# Patient Record
Sex: FEMALE | Race: WHITE | NOT HISPANIC OR LATINO | URBAN - METROPOLITAN AREA
[De-identification: names, ages, dates, MRNs, and addresses within clinical notes are randomized per-mention and may not be internally consistent; named-entity substitution may affect disease eponyms.]

---

## 2017-06-06 ENCOUNTER — OFFICE VISIT - RIVER FALLS (OUTPATIENT)
Dept: FAMILY MEDICINE | Facility: CLINIC | Age: 62
End: 2017-06-06

## 2017-06-06 ASSESSMENT — MIFFLIN-ST. JEOR: SCORE: 1285.77

## 2017-07-18 ENCOUNTER — OFFICE VISIT - RIVER FALLS (OUTPATIENT)
Dept: FAMILY MEDICINE | Facility: CLINIC | Age: 62
End: 2017-07-18

## 2017-07-18 ASSESSMENT — MIFFLIN-ST. JEOR: SCORE: 1300.29

## 2018-06-12 ENCOUNTER — OFFICE VISIT - RIVER FALLS (OUTPATIENT)
Dept: FAMILY MEDICINE | Facility: CLINIC | Age: 63
End: 2018-06-12

## 2018-06-12 ASSESSMENT — MIFFLIN-ST. JEOR: SCORE: 1283.85

## 2019-06-28 ENCOUNTER — OFFICE VISIT - RIVER FALLS (OUTPATIENT)
Dept: FAMILY MEDICINE | Facility: CLINIC | Age: 64
End: 2019-06-28

## 2019-06-28 ASSESSMENT — MIFFLIN-ST. JEOR: SCORE: 1287.48

## 2019-07-05 ENCOUNTER — AMBULATORY - RIVER FALLS (OUTPATIENT)
Dept: FAMILY MEDICINE | Facility: CLINIC | Age: 64
End: 2019-07-05

## 2019-07-06 LAB
BUN SERPL-MCNC: 12 MG/DL (ref 7–25)
BUN/CREAT RATIO - HISTORICAL: NORMAL (ref 6–22)
CALCIUM SERPL-MCNC: 9.7 MG/DL (ref 8.6–10.4)
CHLORIDE BLD-SCNC: 106 MMOL/L (ref 98–110)
CHOLEST SERPL-MCNC: 193 MG/DL
CHOLEST/HDLC SERPL: 3.2 {RATIO}
CO2 SERPL-SCNC: 26 MMOL/L (ref 20–32)
CREAT SERPL-MCNC: 0.83 MG/DL (ref 0.5–0.99)
EGFRCR SERPLBLD CKD-EPI 2021: 74 ML/MIN/1.73M2
ERYTHROCYTE [DISTWIDTH] IN BLOOD BY AUTOMATED COUNT: 13 % (ref 11–15)
GLUCOSE BLD-MCNC: 87 MG/DL (ref 65–99)
HCT VFR BLD AUTO: 39.5 % (ref 35–45)
HDLC SERPL-MCNC: 61 MG/DL
HGB BLD-MCNC: 13.2 GM/DL (ref 11.7–15.5)
LDLC SERPL CALC-MCNC: 117 MG/DL
MCH RBC QN AUTO: 30.2 PG (ref 27–33)
MCHC RBC AUTO-ENTMCNC: 33.4 GM/DL (ref 32–36)
MCV RBC AUTO: 90.4 FL (ref 80–100)
NONHDLC SERPL-MCNC: 132 MG/DL
PLATELET # BLD AUTO: 241 10*3/UL (ref 140–400)
PMV BLD: 11.3 FL (ref 7.5–12.5)
POTASSIUM BLD-SCNC: 4.8 MMOL/L (ref 3.5–5.3)
RBC # BLD AUTO: 4.37 10*6/UL (ref 3.8–5.1)
SODIUM SERPL-SCNC: 141 MMOL/L (ref 135–146)
TRIGL SERPL-MCNC: 48 MG/DL
WBC # BLD AUTO: 5 10*3/UL (ref 3.8–10.8)

## 2019-07-09 ENCOUNTER — COMMUNICATION - RIVER FALLS (OUTPATIENT)
Dept: FAMILY MEDICINE | Facility: CLINIC | Age: 64
End: 2019-07-09

## 2019-10-11 ENCOUNTER — AMBULATORY - RIVER FALLS (OUTPATIENT)
Dept: FAMILY MEDICINE | Facility: CLINIC | Age: 64
End: 2019-10-11

## 2022-02-12 VITALS
DIASTOLIC BLOOD PRESSURE: 60 MMHG | HEART RATE: 64 BPM | WEIGHT: 166.8 LBS | SYSTOLIC BLOOD PRESSURE: 118 MMHG | BODY MASS INDEX: 27.79 KG/M2 | TEMPERATURE: 98.2 F | HEIGHT: 65 IN

## 2022-02-12 VITALS
HEIGHT: 66 IN | BODY MASS INDEX: 26.33 KG/M2 | SYSTOLIC BLOOD PRESSURE: 114 MMHG | TEMPERATURE: 97.8 F | SYSTOLIC BLOOD PRESSURE: 126 MMHG | WEIGHT: 167 LBS | HEIGHT: 66 IN | DIASTOLIC BLOOD PRESSURE: 78 MMHG | BODY MASS INDEX: 26.84 KG/M2 | WEIGHT: 163.8 LBS | TEMPERATURE: 98.8 F | HEART RATE: 80 BPM | HEART RATE: 88 BPM | DIASTOLIC BLOOD PRESSURE: 76 MMHG

## 2022-02-12 VITALS
HEART RATE: 80 BPM | SYSTOLIC BLOOD PRESSURE: 132 MMHG | BODY MASS INDEX: 27.66 KG/M2 | WEIGHT: 166 LBS | HEIGHT: 65 IN | TEMPERATURE: 98.6 F | DIASTOLIC BLOOD PRESSURE: 80 MMHG

## 2022-02-16 NOTE — NURSING NOTE
Phone Message    PCP:   HAYDE      Time of Call:  10:05 am       Person Calling:  pt  Phone number:  [phone]    Returned call at: 11:07 am    Note:   Pt calls requesting to be seen this afternoon after 4:30. c/o rash on bilateral ankles that she thinks is a reaction to something. Asked if she could come earlier as there are more openings available - pt states she has manditory overtime that she can't leave work. Asked her to come at 4:30, pt aware there may be a wait as she is being worked in.    Last office visit and reason:  6/6/17

## 2022-02-16 NOTE — TELEPHONE ENCOUNTER
---------------------  From: Mai Drummond   To: Wali Carbone MD;     Sent: 7/5/2019 8:56:07 AM CDT  Subject: General Message     Patient is wanting to schedule a dexa scan with her mammogram at [facility] but needs an order, if okay to have please enter order and I will get it to them. Thank you  ** Submitted: **  Order:Bone Densitometry (Request)  Details:  Osteoporosis         Signed by Wali Carbone MD  7/5/2019 9:15:00 AM---------------------  From: Wali Carbone MD   To: Mai Drummond;     Sent: 7/5/2019 9:16:01 AM CDT  Subject: RE: General Message     OK

## 2022-02-16 NOTE — TELEPHONE ENCOUNTER
---------------------  From: Sofi Carey   To: Tona KELLOGG Rueter;     Sent: 8/21/2019 9:45:15 AM CDT  Subject: General Message     For your information, we have no record that your patient completed the DEXA ordered 07/05/2019.  Per Ashleigh, patient declined.  Sofi Carey

## 2022-02-16 NOTE — PROGRESS NOTES
Patient:   DERIAN HUANG            MRN: [id number]            FIN: [id number]               Age:   61 years     Sex:  Female     :  1955   Associated Diagnoses:   Well adult exam   Author:   Wali Carbone MD      Report Summary   DiagnosisCourse:  Well controlled. Patient Instructions:       Counseled: Patient, Verbalized understanding. Counseled:  Patient.    Visit Information      Date of Service: 2017 04:53 pm  Performing Location: [facility]  Encounter#: [id number]      Primary Care Provider (PCP):  Wali Carbone MD    NPI# [id number]      Referring Provider:  Wali Carbone MD# [id number]   Visit type:  Annual exam.    Source of history:  Self.    History limitation:  None.       Chief Complaint   2017 4:57 PM CDT     Annual px     Patient presents today for a general physical.      Well Adult History   Well Adult History   The general health status is good.  The patient's diet is described as balanced.  Exercise: routine.  Last menstrual period: patient no longer menstruates due to hysterectomy, Remote.  The effect on daily activities is no change in activity level, no change in eating habits and no change in sexual activity.        Review of Systems   Constitutional:  No fever, No chills, No sweats.    Eye:  No blurring, No double vision.    Respiratory:  No shortness of breath, No cough, No wheezing.    Cardiovascular:  No chest pain, No palpitations, No peripheral edema.    Breast:  Negative.    Gastrointestinal:  No nausea, No vomiting, No diarrhea, No constipation.    Genitourinary:  No dysuria.    Gynecologic:  Negative.    Hematology/Lymphatics:  No bruising tendency.    Endocrine:  No excessive thirst, No polyuria, No cold intolerance, No heat intolerance.    Musculoskeletal:  No joint pain.    Integumentary:  No rash.    Neurologic:  Not alert and oriented X4, No confusion, No numbness, No tingling, No headache.    Psychiatric:  Negative.     All other systems reviewed and negative      Health Status   Allergies:    Allergic Reactions (Selected)  No known allergies   Medications:  (Selected)   Documented Medications  Documented  Calcium 600+D: PO, BID, 0 Refill(s)  Vitamin B Complex oral capsule: 1 cap(s), PO, Daily, 0 Refill(s)  Vitamin C: daily, 0 Refill(s)  omega-3 polyunsaturated fatty acids oral capsule: 0 Refill(s)   Problem list:    All Problems  Fibrocystic breast disease / ICD-9-.1 / Confirmed  Osteoporosis / ICD-9-.00 / Confirmed  Resolved: Pregnancy / SNOMED CT 937711230  Resolved: Tobacco abuse / ICD-9-.1      Histories   Past Medical History:    Active  Osteoporosis (ICD-9-.00)  Fibrocystic breast disease (ICD-9-.1)  Resolved  Tobacco abuse (ICD-9-.1):  Resolved on 2010 at 54 years.  Pregnancy (SNOMED CT 763321083):  Resolved in  at 22 years.   Family History:    Diabetes mellitus  Father ()  CVA - Cerebrovascular accident  Father ()  Glaucoma  Mother     Procedure history:    Colonoscopy (SNOMED CT 399869898) performed by Jeremy Erwin MD on 2013 at 57 Years.  Comments:  2013 1:42 PM - Deven Bolivar MA  Normal colonoscopy, repeat in 5 years due to hx of polyps  Dorsal first compartment release, left wrist on 2010 at 55 Years.  Colonoscopy performed by Jeremy Erwin MD on 2007 at 51 Years.  Polypectomy (SNOMED CT 821217674) in  at 47 Years.  Hysterectomy (SNOMED CT 391398068).  Hemorrhoidectomy (SNOMED CT 66166856).   Social History:        Alcohol Assessment: Past            Past      Tobacco Assessment: Past            Past, Cigarettes, 20 per day.  25 year(s).                     Comments:                      2010 - Christi Anderson CMA                     Quit       Substance Abuse Assessment: Denies Substance Abuse            Never      Employment and Education Assessment            Employed                     Comments:                       04/26/2013 - [employer]      Home and Environment Assessment            Marital status:  (Living together).  Spouse/Partner name: Scott.  Lives with Spouse.  1 children.               Living situation: Home/Independent.                     Comments:                      04/26/2013 - [employer] L[employer]      Nutrition and Health Assessment            Type of diet: Regular.      Exercise and Physical Activity Assessment: Does not exercise      Sexual Assessment            Sexual orientation: Heterosexual.  ,        Alcohol Assessment: Past            Past      Tobacco Assessment: Past            Past, Cigarettes, 20 per day.  25 year(s).                     Comments:                      07/06/2010 - Christi Anderson CMA 2001      Substance Abuse Assessment: Denies Substance Abuse            Never      Employment and Education Assessment            Employed                     Comments:                      04/26/2013 - [employer] L[employer]      Home and Environment Assessment            Marital status:  (Living together).  Spouse/Partner name: Scott.  Lives with Spouse.  1 children.               Living situation: Home/Independent.                     Comments:                      04/26/2013 - [employer] L[employer]      Nutrition and Health Assessment            Type of diet: Regular.      Exercise and Physical Activity Assessment: Does not exercise      Sexual Assessment            Sexual orientation: Heterosexual.        Physical Examination   Vital Signs   6/6/2017 4:57 PM CDT Temperature Temporal 98.8 DegF    Peripheral Pulse Rate 80 bpm    Pulse Site Radial artery    HR Method Manual    Systolic Blood Pressure 114 mmHg    Diastolic Blood Pressure 76 mmHg    Mean Arterial Pressure 89 mmHg    BP Site Right arm    BP  Method Manual      Measurements from flowsheet : Measurements   6/6/2017 4:57 PM CDT Height Measured - Standard 65.75 in    Weight Measured - Standard 163.8 lb    BSA 1.85 m2    Body Mass Index 26.64 kg/m2      General:  Alert and oriented, No acute distress.    Eye:  Pupils are equal, round and reactive to light, Extraocular movements are intact, Normal conjunctiva.    HENT:  Normocephalic, Tympanic membranes are clear, Normal hearing, Oral mucosa is moist, No pharyngeal erythema.    Neck:  Supple, No carotid bruit, No lymphadenopathy, No thyromegaly.    Respiratory:  Lungs are clear to auscultation, Breath sounds are equal.    Cardiovascular:  Regular rhythm, No murmur, Good pulses equal in all extremities, No edema.    Breast:  No mass, No tenderness, No discharge.    Gastrointestinal:  Soft, Non-tender, Normal bowel sounds, No organomegaly.    Genitourinary:  Declined Exam.    Musculoskeletal:  Normal range of motion.    Integumentary:  Warm, Dry, Pink.    Neurologic:  Alert, Oriented, Normal sensory, Normal motor function, No focal deficits.    Psychiatric:  Cooperative, Appropriate mood & affect, Normal judgment, Patient's PHQ9 and CAGE questionnaire reviewed and discussed with patient..       Impression and Plan   Diagnosis     Well adult exam (JUG54-YV Z00.00).     Course:  Well controlled.    Patient Instructions:       Counseled: Patient, Verbalized understanding.    Counseled:  Patient.

## 2022-02-16 NOTE — LETTER
(Inserted Image. Unable to display)   June 12, 2019      DERIAN HUANG  [address]   [place], [place] [id number]        Dear DERIAN,      Thank you for selecting [facility] (previously [facility]) for your healthcare needs.     Our records indicate you are due for the following services:     Annual Physical    To schedule an appointment or if you have further questions, please contact your primary clinic:   [facility]          [phone]   Former[facility]    [phone]             [facility]         [phone]      Powered by [employer]    Sincerely,    Fartun Villarreal M.D.

## 2022-02-16 NOTE — NURSING NOTE
Comprehensive Intake Entered On:  6/28/2019 1:13 PM CDT    Performed On:  6/28/2019 1:09 PM CDT by Jerri Blank CMA               Summary   Chief Complaint :   Physical    Weight Measured :   166.8 lb(Converted to: 166 lb 13 oz, 75.66 kg)    Height Measured :   65 in(Converted to: 5 ft 5 in, 165.10 cm)    Body Mass Index :   27.75 kg/m2 (HI)    Body Surface Area :   1.86 m2   Systolic Blood Pressure :   118 mmHg   Diastolic Blood Pressure :   60 mmHg   Mean Arterial Pressure :   79 mmHg   Peripheral Pulse Rate :   64 bpm   BP Method :   Manual   HR Method :   Manual   Temperature Tympanic :   98.2 DegF(Converted to: 36.8 DegC)    Jerri Blank CMA - 6/28/2019 1:09 PM CDT   Health Status   Allergies Verified? :   Yes   Medication History Verified? :   Yes   Immunizations Current :   Yes   Medical History Verified? :   Yes   Pre-Visit Planning Status :   Completed   Jerri Blank CMA - 6/28/2019 1:09 PM CDT   Consents   Consent for Immunization Exchange :   Consent Granted   Consent for Immunizations to Providers :   Consent Granted   Jerri Blank CMA - 6/28/2019 1:09 PM CDT   Meds / Allergies   (As Of: 6/28/2019 1:13:30 PM CDT)   Allergies (Active)   No known allergies  Estimated Onset Date:   Unspecified ; Created By:   Eula Zhang CMA; Reaction Status:   Active ; Category:   Drug ; Substance:   No known allergies ; Type:   Allergy ; Updated By:   Eula Zhang CMA; Reviewed Date:   6/28/2019 1:11 PM CDT      No Known Medication Allergies  Estimated Onset Date:   Unspecified ; Created By:   Eula Zhang CMA; Reaction Status:   Active ; Category:   Drug ; Substance:   No Known Medication Allergies ; Type:   Allergy ; Updated By:   Eula Zhang CMA; Reviewed Date:   6/28/2019 1:11 PM CDT        Medication List   (As Of: 6/28/2019 1:13:30 PM CDT)   Home Meds    ascorbic acid  :   ascorbic acid ; Status:   Documented ; Ordered As Mnemonic:   Vitamin C ; Simple Display Line:   daily ; Catalog Code:   ascorbic acid ;  Order Dt/Tm:   7/2/2010 12:17:32 PM          calcium-vitamin D  :   calcium-vitamin D ; Status:   Documented ; Ordered As Mnemonic:   Calcium 600+D ; Simple Display Line:   PO, BID ; Catalog Code:   calcium-vitamin D ; Order Dt/Tm:   7/2/2010 12:18:20 PM          multivitamin  :   multivitamin ; Status:   Documented ; Ordered As Mnemonic:   Vitamin B Complex oral capsule ; Simple Display Line:   1 cap(s), PO, Daily ; Catalog Code:   multivitamin ; Order Dt/Tm:   7/6/2010 6:14:15 PM          omega-3 polyunsaturated fatty acids  :   omega-3 polyunsaturated fatty acids ; Status:   Documented ; Ordered As Mnemonic:   omega-3 polyunsaturated fatty acids oral capsule ; Catalog Code:   omega-3 polyunsaturated fatty acids ; Order Dt/Tm:   7/2/2010 12:17:41 PM

## 2022-02-16 NOTE — PROGRESS NOTES
Patient:   DERIAN HUANG            MRN: [id number]            FIN: [id number]               Age:   61 years     Sex:  Female     :  1955   Associated Diagnoses:   Petechial rash   Author:   Fartun Villarreal MD      Chief Complaint   2017 4:36 PM CDT    c/o bilateral ankle rash; declinces any itching or pain x 3-4 days; started with left ankle and then to the right a day later      History of Present Illness   Chief complaint and symptoms reviewed with patient and confirmed as above.  Rash started as bright red rash on left anterior ankle then started on right anterior ankle. Not bothersome. Started over the weekend.   Minimal change in past 2 days.  No new exposures.   No new medications.         Health Status   Allergies:    Allergic Reactions (All)  No known allergies  No Known Medication Allergies   Medications:  (Selected)   Documented Medications  Documented  Calcium 600+D: PO, BID, 0 Refill(s)  Vitamin B Complex oral capsule: 1 cap(s), PO, Daily, 0 Refill(s)  Vitamin C: daily, 0 Refill(s)  omega-3 polyunsaturated fatty acids oral capsule: 0 Refill(s)   Problem list:    All Problems (Selected)  Fibrocystic breast disease / ICD-9-.1 / Confirmed  Osteoporosis / ICD-9-.00 / Confirmed      Histories   Past Medical History:    Active  Osteoporosis (ICD-9-.00)  Fibrocystic breast disease (ICD-9-.1)  Resolved  Tobacco abuse (ICD-9-.1):  Resolved on 2010 at 54 years.  Pregnancy (SNOMED CT 938873337):  Resolved in  at 22 years.      Physical Examination   Vital Signs   2017 4:36 PM CDT Temperature Tympanic 97.8 DegF  LOW    Peripheral Pulse Rate 88 bpm    Pulse Site Radial artery    HR Method Manual    Systolic Blood Pressure 126 mmHg    Diastolic Blood Pressure 78 mmHg    Mean Arterial Pressure 94 mmHg    BP Site Left arm    BP Method Manual      Measurements from flowsheet : Measurements   2017 4:36 PM CDT Height Measured - Standard 65.75 in    Weight  Measured - Standard 167 lb    BSA 1.87 m2    Body Mass Index 27.16 kg/m2      General:  Alert and oriented, No acute distress.    Integumentary:  bilateral ankles with petechial rash, not swollen, not tender.       Impression and Plan   Diagnosis     Petechial rash (KRK33-PR R23.3).     Plan:  Unclear etiology. Will check cbc and sed rate. Start medrol dose pack. If not resolving, consider biopsy.

## 2022-02-16 NOTE — PROGRESS NOTES
Patient:   DERIAN HUANG            MRN: [id number]            FIN: [id number]               Age:   63 years     Sex:  Female     :  1955   Associated Diagnoses:   Well adult exam   Author:   Wali Carbone MD      Report Summary   DiagnosisCourse:  Well controlled. Counseled:  Patient.    Visit Information      Date of Service: 2019 12:58 pm  Performing Location: [facility]  Encounter#: [id number]      Primary Care Provider (PCP):  Wali Carbone MD# [id number]      Referring Provider:  Wali Carbone MD# [id number]   Visit type:  Annual exam.    Source of history:  Self.    History limitation:  None.       Chief Complaint   2019 1:09 PM CDT    Physical     Patient presents today for a general physical.      Well Adult History   Well Adult History   The general health status is good.  The patient's diet is described as balanced.  Last menstrual period: 2007 , patient no longer menstruates due to hysterectomy, Remote.  The effect on daily activities is no change in activity level, no change in eating habits and no change in sexual activity.  Additional pertinent history: seat belt use, daily caffeine use, tobacco use none and alcohol use socially.        Review of Systems   Constitutional:  No fever, No chills, No sweats.    Eye:  No blurring, No double vision.    Respiratory:  No shortness of breath, No cough, No wheezing.    Cardiovascular:  No chest pain, No palpitations, No peripheral edema.    Breast:  Negative.    Gastrointestinal:  No nausea, No vomiting, No diarrhea, No constipation.    Genitourinary:  No dysuria.    Gynecologic:  Negative.    Hematology/Lymphatics:  No bruising tendency.    Endocrine:  No excessive thirst, No polyuria, No cold intolerance, No heat intolerance.    Musculoskeletal:  No joint pain.    Integumentary:  No rash.    Neurologic:  Not alert and oriented X4, No confusion, No numbness, No tingling, No headache.     Psychiatric:  Negative.    All other systems reviewed and negative      Health Status   Allergies:    Allergic Reactions (Selected)  No known allergies  No Known Medication Allergies   Medications:  (Selected)   Documented Medications  Documented  Calcium 600+D: PO, BID, 0 Refill(s)  Vitamin B Complex oral capsule: 1 cap(s), PO, Daily, 0 Refill(s)  Vitamin C: daily, 0 Refill(s)  omega-3 polyunsaturated fatty acids oral capsule: 0 Refill(s)   Problem list:    All Problems  Fibrocystic breast disease / ICD-9-.1 / Confirmed  Osteoporosis / ICD-9-.00 / Confirmed  Resolved: Pregnancy / SNOMED CT 748595635  Resolved: Tobacco abuse / ICD-9-.1      Histories   Past Medical History:    Active  Osteoporosis (ICD-9-.00)  Fibrocystic breast disease (ICD-9-.1)  Resolved  Tobacco abuse (ICD-9-.1):  Resolved on 2010 at 54 years.  Pregnancy (SNOMED CT 392461834):  Resolved in  at 22 years.   Family History:    Diabetes mellitus  Father ()  CVA - Cerebrovascular accident  Father ()  Glaucoma  Mother     Procedure history:    Colonoscopy (SNOMED CT 519857653) performed by Venancio Lundberg MD on 2018 at 62 Years.  Comments:  2018 8:40 AM CDT - Tayler Rae  Indication:  History of colon polyps.  Sedation:  MAC.  Impression:  One 5 mm (appearance adenomatous) polyp at 90 cm; removed.    Recommendation:  Repeat in 5 years if adenomatous.  Colonoscopy (SNOMED CT 833154342) performed by Jeremy Erwin MD on 2013 at 57 Years.  Comments:  2013 1:42 PM CDT - Deven Bolivar MA  Normal colonoscopy, repeat in 5 years due to hx of polyps  Dorsal first compartment release, left wrist on 2010 at 55 Years.  Colonoscopy performed by Jeremy Erwin MD on 2007 at 51 Years.  Polypectomy (SNOMED CT 262405257) in  at 47 Years.  Hysterectomy (SNOMED CT 720902986).  Hemorrhoidectomy (SNOMED CT 25413188).   Social History:        Alcohol Assessment: Past             Past      Tobacco Assessment: Past            Past, Cigarettes, 20 per day.  23 year(s).                     Comments:                      07/06/2010 - Christi Anderson CMA 2001      Substance Abuse Assessment: Denies Substance Abuse            Never      Employment and Education Assessment            Employed                     Comments:                      04/26/2013 - [employer] L[employer]      Home and Environment Assessment            Marital status: .  1 children.  Living situation: Home/Independent.                     Comments:                      04/26/2013 - [employer] L[employer]      Nutrition and Health Assessment            Type of diet: Regular.      Exercise and Physical Activity Assessment: Does not exercise            Exercise frequency: 3-5 x per wk..  Exercise type: Walking, Also, walking and lifting at work..      Sexual Assessment            Sexual orientation: Heterosexual.,        Alcohol Assessment: Past            Past      Tobacco Assessment: Past            Past, Cigarettes, 20 per day.  23 year(s).                     Comments:                      07/06/2010 - Christi Anderson CMA 2001      Substance Abuse Assessment: Denies Substance Abuse            Never      Employment and Education Assessment            Employed                     Comments:                      04/26/2013 - [employer] L[employer]      Home and Environment Assessment            Marital status: .  1 children.  Living situation: Home/Independent.                     Comments:                      04/26/2013 - [employer] L[employer]      Nutrition and Health Assessment            Type of diet: Regular.      Exercise and Physical Activity Assessment: Does not exercise            Exercise frequency: 3-5 x per wk..   Exercise type: Walking, Also, walking and lifting at work..      Sexual Assessment            Sexual orientation: Heterosexual.      Physical Examination   Vital Signs   6/28/2019 1:09 PM CDT Temperature Tympanic 98.2 DegF    Peripheral Pulse Rate 64 bpm    HR Method Manual    Systolic Blood Pressure 118 mmHg    Diastolic Blood Pressure 60 mmHg    Mean Arterial Pressure 79 mmHg    BP Method Manual      Measurements from flowsheet : Measurements   6/28/2019 1:09 PM CDT Height Measured - Standard 65 in    Weight Measured - Standard 166.8 lb    BSA 1.86 m2    Body Mass Index 27.75 kg/m2  HI      General:  Alert and oriented, No acute distress.    Eye:  Pupils are equal, round and reactive to light, Extraocular movements are intact, Normal conjunctiva.    HENT:  Normocephalic, Tympanic membranes are clear, Normal hearing, Oral mucosa is moist, No pharyngeal erythema.    Neck:  Supple, No carotid bruit, No lymphadenopathy, No thyromegaly.    Respiratory:  Lungs are clear to auscultation, Breath sounds are equal.    Cardiovascular:  Regular rhythm, No murmur, Good pulses equal in all extremities, No edema.    Breast:  No mass, No tenderness, No discharge.    Gastrointestinal:  Soft, Non-tender, Normal bowel sounds, No organomegaly.    Genitourinary:  Declined Exam.    Musculoskeletal:  Normal range of motion.    Integumentary:  Warm, Dry, Pink.    Neurologic:  Alert, Oriented, Normal sensory, Normal motor function, No focal deficits.    Psychiatric:  Cooperative, Appropriate mood & affect, Normal judgment, Patient's PHQ9 and CAGE questionnaire reviewed and discussed with patient..       Impression and Plan   Diagnosis     Well adult exam (RQL30-DR Z00.00).     Course:  Well controlled.    Counseled:  Patient.   Breath sounds clear and equal bilaterally.

## 2022-02-16 NOTE — PROGRESS NOTES
Patient:   DERIAN HUANG            MRN: [id number]            FIN: [id number]               Age:   62 years     Sex:  Female     :  1955   Associated Diagnoses:   Physical exam; Colon cancer screening   Author:   Fartun Villarreal MD      Visit Information   Visit type:  Annual exam.    Source of history:  Self.    History limitation:  None.       Chief Complaint   2018 5:02 PM CDT    Annual Px      Well Adult History   Well Adult History             The patient presents for well adult exam.  The patient's general health status is described as good.  The patient's diet is described as balanced.  Associated symptoms consist of none.  Medical encounters: none.     History of colon polyps with first colonoscopy. Due for a 5 year repeat colonoscopy.      Review of Systems   ROS reviewed as documented in chart      Health Status   Allergies:    Allergic Reactions (Selected)  No known allergies  No Known Medication Allergies   Medications:  (Selected)   Documented Medications  Documented  Calcium 600+D: PO, BID, 0 Refill(s)  Vitamin B Complex oral capsule: 1 cap(s), PO, Daily, 0 Refill(s)  Vitamin C: daily, 0 Refill(s)  omega-3 polyunsaturated fatty acids oral capsule: 0 Refill(s)   Problem list:    All Problems  Fibrocystic breast disease / ICD-9-.1 / Confirmed  Osteoporosis / ICD-9-.00 / Confirmed  Resolved: Pregnancy / SNOMED CT 795806936  Resolved: Tobacco abuse / ICD-9-.1      Histories   Past Medical History:    Active  Osteoporosis (ICD-9-.00)  Fibrocystic breast disease (ICD-9-.1)  Resolved  Tobacco abuse (ICD-9-.1):  Resolved on 2010 at 54 years.  Pregnancy (SNOMED CT 222220907):  Resolved in  at 22 years.   Family History:    Diabetes mellitus  Father ()  CVA - Cerebrovascular accident  Father ()  Glaucoma  Mother     Procedure history:    Colonoscopy (483573102) on 2013 at 57 Years.  Comments:  2013 1:42 PM - Osmel CARRANZA  Deven  Normal colonoscopy, repeat in 5 years due to hx of polyps  Dorsal first compartment release, left wrist on 12/29/2010 at 55 Years.  Colonoscopy on 9/6/2007 at 51 Years.  Polypectomy (339426148) in 2002 at 47 Years.  Hysterectomy (231910017).  Hemorrhoidectomy (11846546).   Social History:        Alcohol Assessment: Past            Past      Tobacco Assessment: Past            Past, Cigarettes, 20 per day.  25 year(s).                     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  1 children.  Living situation: Home/Independent.                     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      Exercise and Physical Activity Assessment: Does not exercise            Exercise frequency: 3-5 x per wk..  Exercise type: Walking, Also, walking and lifting at work..      Sexual Assessment            Sexual orientation: Heterosexual.        Physical Examination   Vital Signs   6/12/2018 5:02 PM CDT Temperature Tympanic 98.6 DegF    Peripheral Pulse Rate 80 bpm    Pulse Site Radial artery    HR Method Manual    Systolic Blood Pressure 132 mmHg  HI    Diastolic Blood Pressure 80 mmHg    Mean Arterial Pressure 97 mmHg    BP Site Left arm    BP Method Manual      Measurements from flowsheet : Measurements   6/12/2018 5:02 PM CDT Height Measured - Standard 65 in    Weight Measured - Standard 166.0 lb    BSA 1.86 m2    Body Mass Index 27.62 kg/m2  HI      General:  Alert and oriented, No acute distress.    Eye:  Pupils are  equal, round and reactive to light, Extraocular movements are intact, Normal conjunctiva.    HENT:  Normocephalic, Tympanic membranes are clear, Oral mucosa is moist, No pharyngeal erythema.    Neck:  Supple, Non-tender, No lymphadenopathy, No thyromegaly.    Respiratory:  Lungs are clear to auscultation.    Cardiovascular:  Normal rate, Regular rhythm.    Breast:  No mass, No tenderness, No discharge.    Gastrointestinal:  Soft, Non-tender, Non-distended, No organomegaly.    Musculoskeletal:  Normal range of motion, Normal strength.    Integumentary:  Warm, Dry, Pink, No rash, no concerning lesions.    Neurologic:  Alert, Oriented, Normal sensory.    Psychiatric:  Cooperative, Appropriate mood & affect.       Impression and Plan   Diagnosis     Physical exam (WSD32-HW Z00.00).     Colon cancer screening (KEH47-PX Z12.11).     Course:  Progressing as expected.    Patient Instructions:       Counseled: Patient, BMI, diet, and exercise.    Orders     Orders (Selected)   Outpatient Orders  Ordered  Return to Clinic (Request): RFV: Annual Exam, Return in 1 year.

## 2022-02-16 NOTE — LETTER
(Inserted Image. Unable to display)     [address] [place] [address] [phone] (phone)  [phone] (fax)  DERIAN HUANG    [address]  [place], [place] [id number]        Dear DERIAN,    Thank you for selecting our practice as your care provider. Below you will find the results and recent diagnostic testings outcomes we have reviewed.        These are acceptable, please keep scheduled follow up appointments.      Result Name Current Result Previous Result Reference Range   Sodium Level (mmol/L)  141 7/5/2019  135 - 146   Potassium Level (mmol/L)  4.8 7/5/2019  3.5 - 5.3   Chloride Level (mmol/L)  106 7/5/2019  98 - 110   CO2 Level (mmol/L)  26 7/5/2019  20 - 32   Glucose Level (mg/dL)  87 7/5/2019  65 - 99   BUN (mg/dL)  12 7/5/2019  7 - 25   Creatinine Level (mg/dL)  0.83 7/5/2019  0.50 - 0.99   eGFR (mL/min/1.73m2)  74 7/5/2019  > OR = 60 -    Calcium Level (mg/dL)  9.7 7/5/2019  8.6 - 10.4   Cholesterol (mg/dL)  193 7/5/2019   - <200   Non-HDL Cholesterol ((H)) 132 7/5/2019   - <130   HDL (mg/dL)  61 7/5/2019  >50 -    Cholesterol/HDL Ratio  3.2 7/5/2019   - <5.0   LDL ((H)) 117 7/5/2019     Triglyceride (mg/dL)  48 7/5/2019   - <150   WBC  5.0 7/5/2019  6.5 7/18/2017 3.8 - 10.8   RBC  4.37 7/5/2019  4.25 7/18/2017 3.80 - 5.10   Hgb (gm/dL)  13.2 7/5/2019  12.7 7/18/2017 11.7 - 15.5   Hct (%)  39.5 7/5/2019  37.9 7/18/2017 35.0 - 45.0   MCV (fL)  90.4 7/5/2019  89.2 7/18/2017 80.0 - 100.0   MCH (pg)  30.2 7/5/2019  29.9 7/18/2017 27.0 - 33.0   MCHC (gm/dL)  33.4 7/5/2019  33.5 7/18/2017 32.0 - 36.0   RDW (%)  13.0 7/5/2019  13.5 7/18/2017 11.0 - 15.0   Platelet  241 7/5/2019  255 7/18/2017 140 - 400   MPV (fL)  11.3 7/5/2019  11.1 7/18/2017 7.5 - 12.5       Please contact my practice at the number listed above if you have any questions or concerns.     Sincerely,        Wali Carbone MD, FAAFP